# Patient Record
Sex: FEMALE | Race: ASIAN | NOT HISPANIC OR LATINO | Employment: FULL TIME | URBAN - METROPOLITAN AREA
[De-identification: names, ages, dates, MRNs, and addresses within clinical notes are randomized per-mention and may not be internally consistent; named-entity substitution may affect disease eponyms.]

---

## 2018-08-19 ENCOUNTER — APPOINTMENT (OUTPATIENT)
Dept: RADIOLOGY | Facility: MEDICAL CENTER | Age: 58
End: 2018-08-19
Attending: EMERGENCY MEDICINE
Payer: COMMERCIAL

## 2018-08-19 ENCOUNTER — HOSPITAL ENCOUNTER (EMERGENCY)
Facility: MEDICAL CENTER | Age: 58
End: 2018-08-19
Attending: EMERGENCY MEDICINE
Payer: COMMERCIAL

## 2018-08-19 VITALS
TEMPERATURE: 96.9 F | RESPIRATION RATE: 16 BRPM | BODY MASS INDEX: 24.63 KG/M2 | WEIGHT: 125.44 LBS | HEIGHT: 60 IN | HEART RATE: 95 BPM | OXYGEN SATURATION: 98 %

## 2018-08-19 DIAGNOSIS — R33.9 URINARY RETENTION: ICD-10-CM

## 2018-08-19 LAB
ALBUMIN SERPL BCP-MCNC: 5.1 G/DL (ref 3.2–4.9)
ALBUMIN/GLOB SERPL: 1.5 G/DL
ALP SERPL-CCNC: 55 U/L (ref 30–99)
ALT SERPL-CCNC: 27 U/L (ref 2–50)
ANION GAP SERPL CALC-SCNC: 12 MMOL/L (ref 0–11.9)
APPEARANCE UR: CLEAR
AST SERPL-CCNC: 40 U/L (ref 12–45)
BACTERIA #/AREA URNS HPF: NEGATIVE /HPF
BASOPHILS # BLD AUTO: 0.3 % (ref 0–1.8)
BASOPHILS # BLD: 0.03 K/UL (ref 0–0.12)
BILIRUB SERPL-MCNC: 1.1 MG/DL (ref 0.1–1.5)
BILIRUB UR QL STRIP.AUTO: NEGATIVE
BUN SERPL-MCNC: 10 MG/DL (ref 8–22)
CALCIUM SERPL-MCNC: 11.5 MG/DL (ref 8.5–10.5)
CHLORIDE SERPL-SCNC: 101 MMOL/L (ref 96–112)
CO2 SERPL-SCNC: 23 MMOL/L (ref 20–33)
COLOR UR: YELLOW
CREAT SERPL-MCNC: 0.85 MG/DL (ref 0.5–1.4)
EOSINOPHIL # BLD AUTO: 0.01 K/UL (ref 0–0.51)
EOSINOPHIL NFR BLD: 0.1 % (ref 0–6.9)
EPI CELLS #/AREA URNS HPF: ABNORMAL /HPF
ERYTHROCYTE [DISTWIDTH] IN BLOOD BY AUTOMATED COUNT: 46 FL (ref 35.9–50)
GLOBULIN SER CALC-MCNC: 3.3 G/DL (ref 1.9–3.5)
GLUCOSE SERPL-MCNC: 133 MG/DL (ref 65–99)
GLUCOSE UR STRIP.AUTO-MCNC: NEGATIVE MG/DL
HCT VFR BLD AUTO: 43.1 % (ref 37–47)
HGB BLD-MCNC: 15.2 G/DL (ref 12–16)
HYALINE CASTS #/AREA URNS LPF: ABNORMAL /LPF
IMM GRANULOCYTES # BLD AUTO: 0.03 K/UL (ref 0–0.11)
IMM GRANULOCYTES NFR BLD AUTO: 0.3 % (ref 0–0.9)
KETONES UR STRIP.AUTO-MCNC: NEGATIVE MG/DL
LEUKOCYTE ESTERASE UR QL STRIP.AUTO: NEGATIVE
LYMPHOCYTES # BLD AUTO: 0.94 K/UL (ref 1–4.8)
LYMPHOCYTES NFR BLD: 10.4 % (ref 22–41)
MCH RBC QN AUTO: 34.5 PG (ref 27–33)
MCHC RBC AUTO-ENTMCNC: 35.3 G/DL (ref 33.6–35)
MCV RBC AUTO: 97.7 FL (ref 81.4–97.8)
MICRO URNS: ABNORMAL
MONOCYTES # BLD AUTO: 0.36 K/UL (ref 0–0.85)
MONOCYTES NFR BLD AUTO: 4 % (ref 0–13.4)
NEUTROPHILS # BLD AUTO: 7.67 K/UL (ref 2–7.15)
NEUTROPHILS NFR BLD: 84.9 % (ref 44–72)
NITRITE UR QL STRIP.AUTO: NEGATIVE
NRBC # BLD AUTO: 0 K/UL
NRBC BLD-RTO: 0 /100 WBC
PH UR STRIP.AUTO: 6.5 [PH]
PLATELET # BLD AUTO: 278 K/UL (ref 164–446)
PMV BLD AUTO: 9.9 FL (ref 9–12.9)
POTASSIUM SERPL-SCNC: 5.1 MMOL/L (ref 3.6–5.5)
PROT SERPL-MCNC: 8.4 G/DL (ref 6–8.2)
PROT UR QL STRIP: NEGATIVE MG/DL
RBC # BLD AUTO: 4.41 M/UL (ref 4.2–5.4)
RBC # URNS HPF: ABNORMAL /HPF
RBC UR QL AUTO: ABNORMAL
SODIUM SERPL-SCNC: 136 MMOL/L (ref 135–145)
SP GR UR STRIP.AUTO: 1
UROBILINOGEN UR STRIP.AUTO-MCNC: 0.2 MG/DL
WBC # BLD AUTO: 9 K/UL (ref 4.8–10.8)
WBC #/AREA URNS HPF: ABNORMAL /HPF

## 2018-08-19 PROCEDURE — 81001 URINALYSIS AUTO W/SCOPE: CPT

## 2018-08-19 PROCEDURE — 51702 INSERT TEMP BLADDER CATH: CPT

## 2018-08-19 PROCEDURE — 74176 CT ABD & PELVIS W/O CONTRAST: CPT

## 2018-08-19 PROCEDURE — 99284 EMERGENCY DEPT VISIT MOD MDM: CPT

## 2018-08-19 PROCEDURE — 303105 HCHG CATHETER EXTRA

## 2018-08-19 PROCEDURE — 85025 COMPLETE CBC W/AUTO DIFF WBC: CPT

## 2018-08-19 PROCEDURE — 80053 COMPREHEN METABOLIC PANEL: CPT

## 2018-08-19 ASSESSMENT — LIFESTYLE VARIABLES: DO YOU DRINK ALCOHOL: NO

## 2018-08-19 ASSESSMENT — PAIN SCALES - GENERAL: PAINLEVEL_OUTOF10: 8

## 2018-08-20 NOTE — ED PROVIDER NOTES
ED Provider Note    Scribed for Richard Moreira M.D. by Arnav Gonzalez. 8/19/2018, 8:49 PM.    Primary care provider: PCP, none noted.  Means of arrival: Walk in  History obtained from: Patient  History limited by: None    CHIEF COMPLAINT  Chief Complaint   Patient presents with   • Unable to Urinate       [facility]  Fred Moura is a 58 y.o. female with a history of kidney stones who presents to the Emergency Department for evaluation of urinary retention. The patient states she was in her normal, baseline state of health until approximately 6 hours ago at which time she developed an inability to urinate. She reports that she was on a boat in the heat earlier today and drank copious amounts of water. Following this she was able to urinate once but subsequently was unable to urinate despite the significant urge.The patient endorses associated loss of appetite and emesis. She denies any dysuria or hematuria.  This has never happened to her before. No exacerbating or alleviating factors noted. Of note, patient does take 50 mg of Benadryl before bed every night. She has been doing this over long-term without any negative side effects.    REVIEW OF SYSTEMS  Pertinent positives include urinary retention, loss of appetite, and emesis. Pertinent negatives include no dysuria. As above, all other systems reviewed and are negative.   See HPI for further details. C    PAST MEDICAL HISTORY  Kidney stones    SURGICAL HISTORY  patient denies any surgical history    SOCIAL HISTORY  Social History   Substance Use Topics   • Smoking status: Never Smoker   • Smokeless tobacco: Never Used   • Alcohol use Yes      Comment: social      History   Drug Use No       FAMILY HISTORY  History reviewed. No pertinent family history.    CURRENT MEDICATIONS  Home Medications     Reviewed by Xin Fine R.N. (Registered Nurse) on 08/19/18 at 2048  Med List Status: Not Addressed   Medication Last Dose Status        Patient Dominik Taking any  Medications                       ALLERGIES  No Known Allergies    PHYSICAL EXAM  VITAL SIGNS: Pulse 94   Temp 36.1 °C (96.9 °F)   Resp 16   Ht 1.524 m (5')   Wt 56.9 kg (125 lb 7.1 oz)   SpO2 99%   BMI 24.50 kg/m²   Vitals reviewed.  Constitutional: Alert in moderate apparent distress.  HENT: No signs of trauma, Bilateral external ears normal, Nose normal.   Eyes: Pupils are equal and reactive, Conjunctiva normal, Non-icteric.   Neck: Normal range of motion, No tenderness, Supple, No stridor.   Lymphatic: No lymphadenopathy noted.   Cardiovascular: Regular rate and rhythm, no murmurs.   Thorax & Lungs: Normal breath sounds, No respiratory distress, No wheezing, No chest tenderness.   Abdomen: Exam deferred until Boyer catheter was placed due to patient comfort. Following Boyer catheter placement, abdomen was soft, nontender, nondistended, with normal active bowel sounds.  Skin: Warm, Dry, No erythema, No rash.   Back: No bony tenderness, No CVA tenderness.   Extremities: Intact distal pulses, No edema, No tenderness, No cyanosis  Musculoskeletal: Good range of motion in all major joints. No tenderness to palpation or major deformities noted.   Neurologic: Alert , Normal motor function, Normal sensory function, No focal deficits noted.   Psychiatric: Affect normal, Judgment normal, Mood normal.     DIAGNOSTIC STUDIES / PROCEDURES    LABS  Labs Reviewed   CBC WITH DIFFERENTIAL - Abnormal; Notable for the following:        Result Value    MCH 34.5 (*)     MCHC 35.3 (*)     Neutrophils-Polys 84.90 (*)     Lymphocytes 10.40 (*)     Neutrophils (Absolute) 7.67 (*)     Lymphs (Absolute) 0.94 (*)     All other components within normal limits   COMP METABOLIC PANEL - Abnormal; Notable for the following:     Anion Gap 12.0 (*)     Glucose 133 (*)     Calcium 11.5 (*)     Albumin 5.1 (*)     Total Protein 8.4 (*)     All other components within normal limits   URINALYSIS,CULTURE IF INDICATED - Abnormal; Notable for the  following:     Occult Blood Moderate (*)     All other components within normal limits   URINE MICROSCOPIC (W/UA) - Abnormal; Notable for the following:     RBC 2-5 (*)     All other components within normal limits   ESTIMATED GFR      All labs reviewed by me.    RADIOLOGY  CT-ABDOMEN-PELVIS W/O   Final Result         1. No evidence of acute inflammatory change.  The study is however limited due to nonuse of intravenous contrast.      2. Nonobstructive calculi in the lower pole right kidney.      3. The urinary bladder is decompressed by Boyer catheter        The radiologist's interpretation of all radiological studies have been reviewed by me.    COURSE & MEDICAL DECISION MAKING  Nursing notes, VS, PMSFHx reviewed in chart.  Differential diagnoses include but not limited to: Nephrolithiasis, urethral stricture, urinary tract infection, bladder outlet obstruction, neoplasm, anticholinergic side effect from Benadryl.    8:49 PM Patient seen and examined at bedside. Patient arrives afebrile with normal vital signs. Patient appears well hydrated and non-toxic. The patient is in significant distress upon initial evaluation due to abdominal discomfort secondary to inability to urinate. The abdominal exam was deferred and a Boyer catheter was immediately placed. Following catheter placement, the patient had 900 mL of urine drained. She then had immediate and complete relief in her symptoms. Urinalysis did not suggest infection. Labs without leukocytosis. Patient had a mildly elevated blood glucose and a mildly elevated calcium. The patient does take calcium for osteoporosis and I have suggested to her that she discontinue the calcium supplements until she follows up with her primary care provider. She will need repeat labs. Given the elevated calcium and the history of nephrolithiasis, I am concerned for an obstructing kidney stone.     CT is without evidence of acute inflammatory change although limited secondary to lack  of IV contrast. No obstructive calculi noted. The urinary bladder was noted to be decompressed by Boyer catheter.    10:39 PM upon reevaluation, the patient was resting comfortably in bed. We discussed her lab and imaging results. I recommended that she keep the Boyer catheter in with a leg bag until following up with the urologist when she returns home to [place]. She is not from this area and is due to take an airplane home tomorrow morning. She agrees to keep the leg bag in place and will follow-up with her 's urologist when she returns home. The patient understands and agrees to discharge home.    The patient will return for new or worsening symptoms and is stable at the time of discharge.    The patient is referred to a primary physician for blood pressure management, diabetic screening, and for all other preventative health concerns.    DISPOSITION:  Patient will be discharged home in stable condition.    FOLLOW UP:  A urologist when you return home to [place]    Schedule an appointment as soon as possible for a visit in 1 day  for further workup      OUTPATIENT MEDICATIONS:  New Prescriptions    No medications on file         FINAL IMPRESSION  1. Urinary retention         Arnav DAVIS (Screvan), am scribing for, and in the presence of, Dr. Richard Moreira MD.    Electronically signed by: Arnav Gonzalez (preston), 8/19/18.    IDr. Richard, personally performed the services described in this documentation as scribed by Arnav Gonzalez in my presence, and it is both accurate and complete.      The note accurately reflects work and decisions made by me.  Richard Moreira  8/20/2018  4:17 AM

## 2018-08-20 NOTE — DISCHARGE INSTRUCTIONS
You were seen in the ER for urinary retention. Your labs and imaging did not reveal significant abnormality that requires further workup, consultation, or admission to the hospital. You are safe to go home. We have decided that you are going to keep the Boyer in place until you follow up with the urologist in [place] when you return home. We have given you a copy of your CT scan results, please take these to the urologist for his/her evaluation. Discontinue your calcium use for the next week or so and follow up with your primary care physician as today you had a mild hypercalcemia. Please continue to drink about 8 ounces of clear liquids every hour. Return to the ER with any new or worsening symptoms.    Acute Urinary Retention, Female  Urinary retention means you are unable to pee completely or at all (empty your bladder).  Follow these instructions at home:  · Drink enough fluids to keep your pee (urine) clear or pale yellow.  · If you are sent home with a tube that drains the bladder (catheter), there will be a drainage bag attached to it. There are two types of bags. One is big that you can wear at night without having to empty it. One is smaller and needs to be emptied more often.  ¨ Keep the drainage bag emptied.  ¨ Keep the drainage bag lower than the tube.  · Only take medicine as told by your doctor.  Contact a doctor if:  · You have a low-grade fever.  · You have spasms or you are leaking pee when you have spasms.  Get help right away if:  · You have chills or a fever.  · Your catheter stops draining pee.  · Your catheter falls out.  · You have increased bleeding that does not stop after you have rested and increased the amount of fluids you had been drinking.  This information is not intended to replace advice given to you by your health care provider. Make sure you discuss any questions you have with your health care provider.  Document Released: 06/05/2009 Document Revised: 05/25/2017 Document  Reviewed: 05/29/2014  [employer] Interactive Patient Education © 2017 [employer]

## 2018-08-20 NOTE — ED NOTES
Pt ambulatory with steady gait, pt verbalized understanding of poc and discharge , no questions ,  VSS and pt in nad at this time  Awaiting CT disc.

## 2018-08-21 NOTE — DISCHARGE PLANNING
08/20/2018 at 1945    MSW received report from [employer]INCE Steward that pt's , Celso ([phone]) has called requesting a letter regarding pt's hospitalization due to them missing their flight.  MSW returned Celso's call and he requested that the letter be e-mailed to: [email or web address].  General letter sent to pt's  regarding pt's ER visit for airline refund.

## 2023-04-12 NOTE — ED TRIAGE NOTES
Fred Moura  58 y.o.  female  Chief Complaint   Patient presents with   • Unable to Urinate     Present to triage c/o acute urinary retention  X 4 hrs. Per patient she feels her bladder will explode. Writhing in pain in triage.   n/a